# Patient Record
Sex: MALE | Race: WHITE | Employment: OTHER | URBAN - METROPOLITAN AREA
[De-identification: names, ages, dates, MRNs, and addresses within clinical notes are randomized per-mention and may not be internally consistent; named-entity substitution may affect disease eponyms.]

---

## 2017-04-13 PROBLEM — I73.9 PVD (PERIPHERAL VASCULAR DISEASE) WITH CLAUDICATION (HCC): Status: ACTIVE | Noted: 2017-04-13

## 2017-04-13 PROBLEM — R09.89 BRUIT OF RIGHT CAROTID ARTERY: Status: ACTIVE | Noted: 2017-04-13

## 2018-03-19 ENCOUNTER — OFFICE VISIT (OUTPATIENT)
Dept: CARDIOLOGY CLINIC | Age: 73
End: 2018-03-19
Payer: MEDICARE

## 2018-03-19 VITALS
HEIGHT: 68 IN | BODY MASS INDEX: 33.04 KG/M2 | WEIGHT: 218 LBS | SYSTOLIC BLOOD PRESSURE: 130 MMHG | DIASTOLIC BLOOD PRESSURE: 80 MMHG | HEART RATE: 65 BPM | RESPIRATION RATE: 16 BRPM

## 2018-03-19 DIAGNOSIS — I25.10 CORONARY ARTERY DISEASE INVOLVING NATIVE CORONARY ARTERY OF NATIVE HEART WITHOUT ANGINA PECTORIS: ICD-10-CM

## 2018-03-19 DIAGNOSIS — I10 ESSENTIAL HYPERTENSION: ICD-10-CM

## 2018-03-19 DIAGNOSIS — I35.0 NONRHEUMATIC AORTIC VALVE STENOSIS: Primary | ICD-10-CM

## 2018-03-19 DIAGNOSIS — I48.0 PAROXYSMAL ATRIAL FIBRILLATION (HCC): ICD-10-CM

## 2018-03-19 DIAGNOSIS — Z95.1 HX OF CABG: ICD-10-CM

## 2018-03-19 DIAGNOSIS — I73.9 PVD (PERIPHERAL VASCULAR DISEASE) (HCC): ICD-10-CM

## 2018-03-19 PROCEDURE — 99214 OFFICE O/P EST MOD 30 MIN: CPT | Performed by: INTERNAL MEDICINE

## 2018-03-19 PROCEDURE — 93000 ELECTROCARDIOGRAM COMPLETE: CPT | Performed by: INTERNAL MEDICINE

## 2018-03-20 NOTE — PROGRESS NOTES
x 2 at age 63's and late 66's    Social History:  Social History     Social History    Marital status:      Spouse name: N/A    Number of children: N/A    Years of education: N/A     Occupational History    Not on file. Social History Main Topics    Smoking status: Former Smoker     Packs/day: 1.00     Types: Cigarettes, Cigars     Quit date: 1/1/2000    Smokeless tobacco: Never Used    Alcohol use No    Drug use: No    Sexual activity: Not on file     Other Topics Concern    Not on file     Social History Narrative    No narrative on file       Allergies: Allergies   Allergen Reactions    Neosporin [Neomycin-Polymyx-Gramicid]        Current Medications:  Current Outpatient Prescriptions   Medication Sig Dispense Refill    cilostazol (PLETAL) 100 MG tablet TAKE ONE TABLET BY MOUTH TWO TIMES A DAY  11    glimepiride (AMARYL) 4 MG tablet TAKE ONE TABLET BY MOUTH TWO TIMES A DAY  1    INVOKANA 100 MG TABS tablet TAKE ONE TABLET BY MOUTH EVERY DAY  2    gabapentin (NEURONTIN) 300 MG capsule Take 300 mg by mouth 3 times daily  3    levothyroxine (SYNTHROID) 75 MCG tablet Take 75 mcg by mouth daily  1    losartan (COZAAR) 100 MG tablet Take 100 mg by mouth daily      Omega 3 1200 MG CAPS Take by mouth      apixaban (ELIQUIS) 5 MG TABS tablet Take 1 tablet by mouth 2 times daily 180 tablet 3    sitaGLIPtin (JANUVIA) 100 MG tablet Take 100 mg by mouth daily      omeprazole (PRILOSEC) 20 MG capsule Take 20 mg by mouth daily M-W-F      simvastatin (ZOCOR) 20 MG tablet Take 1 tablet by mouth nightly. 90 tablet 1    Multiple Vitamins-Minerals (MULTIVITAMIN PO) Take  by mouth daily.  cilostazol (PLETAL) 100 MG tablet Take 1 tablet by mouth 2 times daily 60 tablet 11     No current facility-administered medications for this visit.       Physical Exam:  /80   Pulse 65   Resp 16   Ht 5' 8\" (1.727 m)   Wt 218 lb (98.9 kg)   BMI 33.15 kg/m²   Wt Readings from Last 3 Encounters: discussion with the patient again today regarding the natural course, diagnostic options, and treatment options for aortic stenosis  - Continue eliquis, statin, and ARB as ordered (BB d/c'd since last office visit due to bradycardia)    Rashid Sanford MD  [facility] ([facility]) Cardiology

## 2018-09-19 ENCOUNTER — OFFICE VISIT (OUTPATIENT)
Dept: CARDIOLOGY CLINIC | Age: 73
End: 2018-09-19
Payer: MEDICARE

## 2018-09-19 VITALS
HEIGHT: 68 IN | RESPIRATION RATE: 14 BRPM | BODY MASS INDEX: 33.34 KG/M2 | SYSTOLIC BLOOD PRESSURE: 138 MMHG | DIASTOLIC BLOOD PRESSURE: 70 MMHG | WEIGHT: 220 LBS | HEART RATE: 78 BPM

## 2018-09-19 DIAGNOSIS — I35.0 MODERATE AORTIC STENOSIS: ICD-10-CM

## 2018-09-19 DIAGNOSIS — I07.1 SEVERE TRICUSPID REGURGITATION: ICD-10-CM

## 2018-09-19 DIAGNOSIS — I25.10 CORONARY ARTERY DISEASE INVOLVING NATIVE CORONARY ARTERY OF NATIVE HEART WITHOUT ANGINA PECTORIS: ICD-10-CM

## 2018-09-19 DIAGNOSIS — I48.0 PAROXYSMAL ATRIAL FIBRILLATION (HCC): Primary | ICD-10-CM

## 2018-09-19 DIAGNOSIS — I10 ESSENTIAL HYPERTENSION: ICD-10-CM

## 2018-09-19 PROCEDURE — 93000 ELECTROCARDIOGRAM COMPLETE: CPT | Performed by: INTERNAL MEDICINE

## 2018-09-19 PROCEDURE — 99214 OFFICE O/P EST MOD 30 MIN: CPT | Performed by: INTERNAL MEDICINE

## 2018-09-20 NOTE — PROGRESS NOTES
OFFICE FOLLOW-UP    Name: Tobias Kenney    Age: 67 y.o. Date of Service: 9/19/18    Chief Complaint: Follow-up for CAD s/p CABG, s/p TV repair, paroxysmal atrial fibrillation, aortic stenosis    Referring Physician: Dr. Katia Bardales    Interim History:   He remains active at home / routinely golfs with no complaints of chest pain, palpitations, dizziness, lightheadedness, or pre-syncopal episodes. No recent SOB or chest pain (at the time of his prior office visit he reported occasional dyspnea on exertion \"when it's humid outside\"). He experienced dyspnea on exertion and had an abnormal stress test in July 2012, which ultimately led to redo CABG.  Currently with no active cardiac complaints at rest.    Review of Systems:   Cardiac: As per HPI  General: No fever, chills  Pulmonary: As per HPI  HEENT: No visual disturbances, difficult swallowing  GI: No nausea, vomiting, abdominal pain, GERD  Musculoskeletal: ARIAS x 4, no focal motor deficits  Skin: Intact, no rashes  Neuro/Psych: No headache, dementia, or focal motor deficits    Past Medical History:  Past Medical History:   Diagnosis Date    Atrial fibrillation ([address]     Bruit of right carotid artery 4/13/2017    CAD (coronary artery disease)     Diabetes mellitus ([address]     Diastolic dysfunction     grade 2    Hyperlipidemia     Postoperative anemia due to acute blood loss     Pulmonary hypertension (HCC)     moderate    PVD (peripheral vascular disease) with claudication ([address] 4/13/2017    Tricuspid regurgitation     moderate     Past Surgical History:  Past Surgical History:   Procedure Laterality Date    [facility]  [facility]    COLONOSCOPY      CORONARY ARTERY BYPASS GRAFT  07/24/12    [facility]    DIAGNOSTIC CARDIAC CATH LAB PROCEDURE  07/18/12    [facility]    ROTATOR CUFF REPAIR Right     9/2015    TONSILLECTOMY      TRANSESOPHAGEAL ECHOCARDIOGRAM  07/18/12    [facility]    WISDOM TOOTH EXTRACTION       Family History:  Father kg)   BMI 33.45 kg/m²   Wt Readings from Last 3 Encounters:   09/19/18 220 lb (99.8 kg)   03/19/18 218 lb (98.9 kg)   07/24/17 227 lb (103 kg)     Appearance: Awake, alert, no acute respiratory distress  Skin: Intact, no rash  Head: Normocephalic, atraumatic  Eyes: EOMI, no conjunctival erythema  ENMT: No pharyngeal erythema, MMM, no rhinorrhea  Neck: Supple, no elevated JVP, no carotid bruits  Lungs: Clear to auscultation bilaterally. No wheezes, rales, or rhonchi. Cardiac: Bradycardic, regular rhythm, +S1S2, 3/6 SUNDAY > RUSB  Abdomen: Soft, nontender, +bowel sounds  Extremities: Moves all extremities x 4, no lower extremity edema  Neurologic: No focal motor deficits apparent, normal mood and affect    Cardiac Tests:  ECG: SR, rate 78, NSSTT changes    Echocardiogram: 7/27/17 (Dr. Carol Solano)   Normal left ventricular systolic function.   Ejection fraction is visually estimated at 60-65%.   Normal right ventricular size and function.   There is doppler evidence of stage I diastolic dysfunction.   Moderate aortic stenosis (AV peak velocity 3.04 m/s, AV mean gradient 24 mmHg, AV area 1.2 cm2, dimensionless index 0.36).  S/P tricuspid repair.   Unable to estimate PASP due to incomplete tricuspid regurgitation envelope. ASSESSMENT / PLAN:  1. CAD s/p CABG in 1990 (LIMA-LAD) and repeat CABG x 3 on 7/24/12 (AMBREEN-ramus, SVG-OM, SVG-RCA)   2. Atrial fibrillation (s/p surgery, s/p prior CVN, maintaining SR, anticoagulated, history of normal LV function, increased [id number] score)   3. HTN -- 's-130's  4. Borderline DM   5. Severe TR s/p tricuspid valve repair (7/24/12)   6. Prior tobacco abuse (quit approximately 15 years ago)  7. Moderate aortic stenosis  8.  Echocardiogram (5/8/15): EF 60%, normal RV function, mild LVH, stage 2 DD, moderately dilated LA, moderate AS, s/p TV repair, mild TR, PASP 38 mmHg    - 7/2017 echocardiogram results reviewed with the patient again today --> repeat echocardiogram  - I had a discussion with the patient again today regarding the natural course, diagnostic options, and treatment options for aortic stenosis  - Continue eliquis, statin, and ARB as ordered (BB d/c'd previously due to bradycardia)    Venus Conley MD  [facility]) Cardiology

## 2019-04-09 ENCOUNTER — TELEPHONE (OUTPATIENT)
Dept: CARDIOLOGY CLINIC | Age: 74
End: 2019-04-09

## 2019-08-12 ENCOUNTER — HOSPITAL ENCOUNTER (OUTPATIENT)
Dept: CARDIOLOGY | Age: 74
Discharge: HOME | End: 2019-08-12
Payer: MEDICARE

## 2019-08-12 DIAGNOSIS — I35.0 MODERATE AORTIC STENOSIS: ICD-10-CM

## 2019-08-12 DIAGNOSIS — I48.0 PAROXYSMAL ATRIAL FIBRILLATION (HCC): ICD-10-CM

## 2019-08-12 LAB
LV EF: 60 %
LVEF MODALITY: NORMAL

## 2019-08-12 PROCEDURE — 93306 TTE W/DOPPLER COMPLETE: CPT

## 2019-08-15 ENCOUNTER — TELEPHONE (OUTPATIENT)
Dept: CARDIOLOGY CLINIC | Age: 74
End: 2019-08-15

## 2019-08-15 DIAGNOSIS — I35.0 SEVERE AORTIC STENOSIS: Primary | ICD-10-CM

## 2019-08-22 ENCOUNTER — HOSPITAL ENCOUNTER (OUTPATIENT)
Dept: NON INVASIVE DIAGNOSTICS | Age: 74
Discharge: HOME | End: 2019-08-22
Payer: MEDICARE

## 2019-08-22 VITALS
WEIGHT: 215 LBS | HEIGHT: 68 IN | DIASTOLIC BLOOD PRESSURE: 66 MMHG | HEART RATE: 75 BPM | TEMPERATURE: 97.5 F | BODY MASS INDEX: 32.58 KG/M2 | RESPIRATION RATE: 20 BRPM | SYSTOLIC BLOOD PRESSURE: 133 MMHG

## 2019-08-22 PROCEDURE — 99211 OFF/OP EST MAY X REQ PHY/QHP: CPT

## 2019-08-22 PROCEDURE — 99204 OFFICE O/P NEW MOD 45 MIN: CPT | Performed by: THORACIC SURGERY (CARDIOTHORACIC VASCULAR SURGERY)

## 2019-08-22 NOTE — PROGRESS NOTES
disease)     Diabetes mellitus [facility]     Diastolic dysfunction     grade 2    Hyperlipidemia     Postoperative anemia due to acute blood loss     Pulmonary hypertension (HCC)     moderate    PVD (peripheral vascular disease) with claudication ([facility] 2017    Tricuspid regurgitation     moderate       Past Surgical History:  Past Surgical History:   Procedure Laterality Date    [facility]  12    [facility]    COLONOSCOPY      CORONARY ARTERY BYPASS GRAFT  2012    Redo CABG x 3 (AMBREEN-RI, SVG-OM, SVG-RCA)  TV Repair    CORONARY ARTERY BYPASS GRAFT      LIMA-LAD    DIAGNOSTIC CARDIAC CATH LAB PROCEDURE  12    [facility]    ROTATOR CUFF REPAIR Right     2015    TONSILLECTOMY      TRANSESOPHAGEAL ECHOCARDIOGRAM  12    [facility]    WISDOM TOOTH EXTRACTION         Social History:  Social History     Socioeconomic History    Marital status:      Spouse name: Not on file    Number of children: Not on file    Years of education: Not on file    Highest education level: Not on file   Occupational History    Not on file   Social Needs    Financial resource strain: Not on file    Food insecurity:     Worry: Not on file     Inability: Not on file    Transportation needs:     Medical: Not on file     Non-medical: Not on file   Tobacco Use    Smoking status: Former Smoker     Packs/day: 1.00     Types: Cigarettes, Cigars     Last attempt to quit: 2000     Years since quittin.6    Smokeless tobacco: Never Used   Substance and Sexual Activity    Alcohol use: No    Drug use: No    Sexual activity: Not on file   Lifestyle    Physical activity:     Days per week: Not on file     Minutes per session: Not on file    Stress: Not on file   Relationships    Social connections:     Talks on phone: Not on file     Gets together: Not on file     Attends Gnosticist service: Not on file     Active member of club or organization: Not on file     Attends meetings of clubs

## 2019-09-05 ENCOUNTER — TELEPHONE (OUTPATIENT)
Dept: CARDIOLOGY CLINIC | Age: 74
End: 2019-09-05

## 2020-08-31 ENCOUNTER — OFFICE VISIT (OUTPATIENT)
Dept: SURGERY | Age: 75
End: 2020-08-31
Payer: MEDICARE

## 2020-08-31 VITALS
OXYGEN SATURATION: 96 % | HEIGHT: 68 IN | SYSTOLIC BLOOD PRESSURE: 120 MMHG | TEMPERATURE: 97.9 F | WEIGHT: 211.8 LBS | RESPIRATION RATE: 16 BRPM | HEART RATE: 72 BPM | DIASTOLIC BLOOD PRESSURE: 75 MMHG | BODY MASS INDEX: 32.1 KG/M2

## 2020-08-31 PROCEDURE — 99204 OFFICE O/P NEW MOD 45 MIN: CPT | Performed by: SURGERY

## 2020-08-31 NOTE — PROGRESS NOTES
History and Physical - General Surgery    Patient's Name/Date of Birth: Dafne Anand / [birth year]    Date: 8/31/2020    PCP: Michelle Dumas MD    Referring Physician:   Martine Marino MD  [phone]      CHIEF COMPLAINT:    Chief Complaint   Patient presents with    Other     pt is here for wound on groin that has been there for 4 months. States there is drainage and pain. HISTORY OF PRESENT ILLNESS:    Dafne Anand is an 76 y.o. male who presents s/p TAVR with a chronic right groin wound. He had this done at [facility]. He has been back four times since and was treated with antibiotics, local wound care without any improvement of the wound. He said the infection improved with antibiotics. He said it hurts a lot and is leaking a lot. Upon review of his op note, it looks like he was closed with a Perclose devise and Angioseal.       Past Medical History:   Past Medical History:   Diagnosis Date    Aortic stenosis 08/2019    [facility]    Atrial fibrillation (HCC)     Bruit of right carotid artery 4/13/2017    CAD (coronary artery disease)     Diabetes mellitus (HCC)     Diastolic dysfunction     grade 2    Hyperlipidemia     Postoperative anemia due to acute blood loss     Pulmonary hypertension (HCC)     moderate    PVD (peripheral vascular disease) with claudication ([address] 4/13/2017    Tricuspid regurgitation     moderate        Past Surgical History:   Past Surgical History:   Procedure Laterality Date    [facility]    COLONOSCOPY      CORONARY ARTERY BYPASS GRAFT  07/24/2012    Redo CABG x 3 (AMBREEN-RI, SVG-OM, SVG-RCA)  TV Repair    CORONARY ARTERY BYPASS GRAFT  1990    LIMA-LAD    DIAGNOSTIC CARDIAC CATH LAB PROCEDURE  07/18/12    [facility] [place]    ROTATOR CUFF REPAIR Right     9/2015    TONSILLECTOMY      TRANSESOPHAGEAL ECHOCARDIOGRAM  07/18/12    [facility]    WISDOM TOOTH EXTRACTION          Allergies: Latex; Hydrocortisone;  Bacitracin; 97.9 °F (36.6 °C) (Oral)   Resp 16   Ht 5' 8\" (1.727 m)   Wt 211 lb 12.8 oz (96.1 kg)   SpO2 96%   BMI 32.20 kg/m²     General appearance: alert, cooperative and in no acute distress. Eyes: Grossly normal   Lungs: Clear to auscultation bilaterally  Heart: regular rate and rhythm  Abdomen: soft, non-tender, non distended, no masses or organomegaly   Skin: right groin wound with foreign body felt, small opening with minimal discharge, 1-2 cm of erythema and tenderness  Neurologic: Alert and oriented x 3. Grossly normal  Musculoskeletal: No clubbing cyanosis or edema. ASSESSMENT AND PLAN:       Assessment: Jhonathan Bowers is an 76 y.o. male who presents with chronic right groin wound after TAVR    Plan: Suspect he may have a stitch abscess or a reaction to the closure device used for his TAVR. Refer to vascular surgery for evaluation and possible groin exploration. The patient said he would like to go to [place] [phone] one more time to see if they can help; if not, I have already discussed the case with Dr. Meaghan Mclean.      Physician Signature: Electronically signed by Heide Geronimo MD, General Surgery    Send copy of H&P to PCP, Edilson Retana MD and referring physician, Leonie Christianson MD

## 2021-01-01 ENCOUNTER — TELEPHONE (OUTPATIENT)
Dept: CARDIOLOGY CLINIC | Age: 76
End: 2021-01-01

## 2021-01-01 ENCOUNTER — OFFICE VISIT (OUTPATIENT)
Dept: CARDIOLOGY CLINIC | Age: 76
End: 2021-01-01
Payer: MEDICARE

## 2021-01-01 ENCOUNTER — HOSPITAL ENCOUNTER (OUTPATIENT)
Dept: CARDIOLOGY | Age: 76
Discharge: HOME | End: 2021-06-08
Payer: MEDICARE

## 2021-01-01 VITALS
SYSTOLIC BLOOD PRESSURE: 110 MMHG | BODY MASS INDEX: 31.98 KG/M2 | HEART RATE: 87 BPM | DIASTOLIC BLOOD PRESSURE: 64 MMHG | WEIGHT: 211 LBS | HEIGHT: 68 IN | RESPIRATION RATE: 14 BRPM

## 2021-01-01 DIAGNOSIS — I10 ESSENTIAL HYPERTENSION: ICD-10-CM

## 2021-01-01 DIAGNOSIS — R06.02 SHORTNESS OF BREATH: ICD-10-CM

## 2021-01-01 DIAGNOSIS — I48.0 PAF (PAROXYSMAL ATRIAL FIBRILLATION) (HCC): ICD-10-CM

## 2021-01-01 DIAGNOSIS — I25.10 CORONARY ARTERY DISEASE INVOLVING NATIVE CORONARY ARTERY OF NATIVE HEART WITHOUT ANGINA PECTORIS: Primary | ICD-10-CM

## 2021-01-01 DIAGNOSIS — I35.0 NON-RHEUMATIC AORTIC STENOSIS: ICD-10-CM

## 2021-01-01 DIAGNOSIS — R06.02 SHORTNESS OF BREATH: Primary | ICD-10-CM

## 2021-01-01 LAB
LV EF: 60 %
LVEF MODALITY: NORMAL

## 2021-01-01 PROCEDURE — 93000 ELECTROCARDIOGRAM COMPLETE: CPT | Performed by: INTERNAL MEDICINE

## 2021-01-01 PROCEDURE — 93306 TTE W/DOPPLER COMPLETE: CPT

## 2021-01-01 PROCEDURE — 99214 OFFICE O/P EST MOD 30 MIN: CPT | Performed by: INTERNAL MEDICINE

## 2021-01-01 RX ORDER — EZETIMIBE 10 MG/1
TABLET ORAL
COMMUNITY
Start: 2021-01-01

## 2021-06-17 NOTE — PROGRESS NOTES
OFFICE FOLLOW-UP    Name: Vera Patel    Age: 76 y.o. Date of Service: 6/17/21    Chief Complaint: Follow-up for CAD s/p CABG, s/p TV repair, paroxysmal atrial fibrillation, aortic stenosis    Referring Physician: Dr. Meryl Goyal    Interim History:   No complaints of chest pain, palpitations, dizziness, lightheadedness, or syncope. He reports that he's still \"really short of breath\" (no improvement s/p TAVR; +patent grafts on cardiac catheterization). Currently with no active cardiac complaints at rest. SR on EKG.     Review of Systems:   Cardiac: As per HPI  General: No fever, chills  Pulmonary: As per HPI  HEENT: No visual disturbances, difficult swallowing  GI: No nausea, vomiting  : No dysuria, hematuria  Endocrine: +hypothyroidism, +DM  Musculoskeletal: ARIAS x 4, no focal motor deficits  Skin: Intact, no rashes  Neuro: No headache, seizures  Psych: Currently with no depression, anxiety    Past Medical History:  Past Medical History:   Diagnosis Date    Aortic stenosis 08/2019    [facility]    Atrial fibrillation (HCC)     Bruit of right carotid artery 4/13/2017    CAD (coronary artery disease)     Diabetes mellitus (HCC)     Diastolic dysfunction     grade 2    Hyperlipidemia     Postoperative anemia due to acute blood loss     Pulmonary hypertension (HCC)     moderate    PVD (peripheral vascular disease) with claudication ([address] 4/13/2017    Tricuspid regurgitation     moderate     Past Surgical History:  Past Surgical History:   Procedure Laterality Date    [facility]/12    [facility]    COLONOSCOPY      CORONARY ARTERY BYPASS GRAFT  07/24/2012    Redo CABG x 3 (AMBREEN-RI, SVG-OM, SVG-RCA)  TV Repair    CORONARY ARTERY BYPASS GRAFT  1990    [facility]-L[facility]    DIAGNOSTIC CARDIAC CATH LAB PROCEDURE  07/18/12    [facility]    ROTATOR CUFF REPAIR Right     9/2015    TONSILLECTOMY      TRANSESOPHAGEAL ECHOCARDIOGRAM  07/18/12    [facility]    WISDOM TOOTH EXTRACTION       Family visually estimated at 60-65%.   Normal right ventricular size and function.   There is doppler evidence of stage I diastolic dysfunction.   Moderate aortic stenosis (AV peak velocity 3.04 m/s, AV mean gradient 24 mmHg, AV area 1.2 cm2, dimensionless index 0.36).  S/P tricuspid repair.   Unable to estimate PASP due to incomplete tricuspid regurgitation envelope. Echocardiogram: 8/12/19 (Dr. Jose Prado)   Normal left ventricular systolic function. Ejection fraction is visually estimated at 60%. Mild concentric left ventricular hypertrophy. Paradoxical low-flow low-gradient severe aortic stenosis (AV peak velocity   3.5 m/s, AV mean gradient 27 mmHg, AV area 0.7 cm2, dimensionless index   0.23, stroke volume index 28 mL/m2). Mild aortic regurgitation. S/P tricuspid repair. Mild tricuspid regurgitation. RV-RA gradient is estimated at 32 mmHg. Cardiac catheterization: 1/17/2020 ([facility])  Coronary Anatomy: Right Dominant     Injection Site(s): Coronary Artery, Left Main Coronary Artery, Right Coronary   Artery, Femoral Artery, Right Common Femoral Artery, Saphenous Vein Graft, Mammary    Artery, Left Internal Mammary Artery - In Situ and Right Internal Mammary Artery   - In Situ     LMT: Additional Comment: Hazy mid-distal LM with an indeterminate degree of   stenosis angiographically. LAD:   Additional Comment: Proximal LAD  with patent LIMA:LAD. The mid-distal LAD   supplied by the LIMA is diffusely a small caliber vessel with a discreet 50%   stenosis just distal to the anastomosis. The distal LAD is a long vessel that   wraps around the apex. LCX: Additional Comment: Severe proximal LCx stenosis with patent SVG:OM supplying    both antegrade and retrograde flow distal to native disease. RAMUS: Additional Comment: Severe proximal ramus stenosis with patent in situ   AMBREEN:RI. Mild anastomotic stenosis.      RCA:   Additional Comment: Severe proximal RCA stenoses with patient SVG:midRCA 30 minutes was spent counseling the patient, reviewing the rationale for the above recommendations and reviewing the patient's current medication list, problem list and results of all previously ordered testing.       Yaneth Myers MD  [facility]) Cardiology